# Patient Record
Sex: MALE | Race: WHITE | NOT HISPANIC OR LATINO | Employment: FULL TIME | URBAN - METROPOLITAN AREA
[De-identification: names, ages, dates, MRNs, and addresses within clinical notes are randomized per-mention and may not be internally consistent; named-entity substitution may affect disease eponyms.]

---

## 2018-07-23 ENCOUNTER — HOSPITAL ENCOUNTER (EMERGENCY)
Facility: HOSPITAL | Age: 53
Discharge: HOME | End: 2018-07-23
Attending: EMERGENCY MEDICINE
Payer: COMMERCIAL

## 2018-07-23 VITALS
BODY MASS INDEX: 44.14 KG/M2 | OXYGEN SATURATION: 96 % | HEART RATE: 88 BPM | WEIGHT: 298 LBS | DIASTOLIC BLOOD PRESSURE: 79 MMHG | HEIGHT: 69 IN | SYSTOLIC BLOOD PRESSURE: 165 MMHG | RESPIRATION RATE: 18 BRPM | TEMPERATURE: 98 F

## 2018-07-23 DIAGNOSIS — R52 PAIN: ICD-10-CM

## 2018-07-23 DIAGNOSIS — S20.212A CONTUSION OF LEFT CHEST WALL, INITIAL ENCOUNTER: Primary | ICD-10-CM

## 2018-07-23 PROCEDURE — 99283 EMERGENCY DEPT VISIT LOW MDM: CPT | Mod: 25

## 2018-07-23 RX ORDER — HYDROCODONE BITARTRATE AND ACETAMINOPHEN 5; 325 MG/1; MG/1
1 TABLET ORAL EVERY 4 HOURS PRN
Qty: 18 TABLET | Refills: 0 | Status: SHIPPED | OUTPATIENT
Start: 2018-07-23

## 2018-07-23 NOTE — ED PROVIDER NOTES
Encounter Date: 7/23/2018       History     Chief Complaint   Patient presents with    Collar Bone Pain     I have left collar bone pain while at work- a cylinder fell off my work truck and hit me in my collar bone and i have a big knot on it now. I also have left hand numbness now too     Patient states that a large metal cylinder fell on him at work, this occurred at work.      The history is provided by the patient.   General Injury    The incident occurred just prior to arrival. The incident occurred at work. The injury mechanism was a direct blow. The injury was related to work. The wounds were not self-inflicted. No protective equipment was used. He came to the ER via by private vehicle. Pertinent negatives include no chest pain.     Review of patient's allergies indicates:   Allergen Reactions    Ativan [lorazepam] Anxiety     Past Medical History:   Diagnosis Date    Abnormal liver function tests     Hypertension     Kidney stones     Kidney stones     Morbid obesity      History reviewed. No pertinent surgical history.  Family History   Problem Relation Age of Onset    Diabetes Unknown      Social History   Substance Use Topics    Smoking status: Never Smoker    Smokeless tobacco: Never Used    Alcohol use No     Review of Systems   Cardiovascular: Negative for chest pain.   Skin: Negative for color change, pallor, rash and wound.   All other systems reviewed and are negative.      Physical Exam     Initial Vitals [07/23/18 1430]   BP Pulse Resp Temp SpO2   (!) 165/79 98 20 98.2 °F (36.8 °C) 96 %      MAP       --         Physical Exam    Nursing note and vitals reviewed.  Constitutional: He appears well-developed and well-nourished.   HENT:   Head: Normocephalic and atraumatic.   Eyes: Conjunctivae and EOM are normal.   Neck: Normal range of motion. Neck supple.   Cardiovascular: Normal rate, regular rhythm and normal heart sounds.   Pulmonary/Chest: Breath sounds normal. He has no wheezes. He  has no rhonchi. He has no rales.       Abdominal: Soft. There is no tenderness. There is no rebound and no guarding.   Musculoskeletal: Normal range of motion.   Neurological: He is alert and oriented to person, place, and time.   Skin: Skin is warm and dry. Capillary refill takes less than 2 seconds.   Psychiatric: He has a normal mood and affect. His behavior is normal. Judgment and thought content normal.         ED Course   Procedures  Labs Reviewed - No data to display       Imaging Results          X-Ray Chest PA And Lateral (Final result)  Result time 07/23/18 15:02:09    Final result by Dallas Cain MD (07/23/18 15:02:09)                 Impression:      No acute process seen.      Electronically signed by: Dallas Cain MD  Date:    07/23/2018  Time:    15:02             Narrative:    EXAMINATION:  XR CHEST PA AND LATERAL    CLINICAL HISTORY:  Pain, unspecified    COMPARISON:  None    FINDINGS:  Cardiac silhouette is normal.  The lungs demonstrate no evidence of active disease.  No evidence of pleural effusion or pneumothorax.  Bones appear intact.                              X-Rays:   Independently Interpreted Readings:   Other Readings:  No fracture or dislocation                         Clinical Impression:   The primary encounter diagnosis was Contusion of left chest wall, initial encounter. A diagnosis of Pain was also pertinent to this visit.      Disposition:   Disposition: Discharged  Condition: Stable                        Shara Moss MD  07/23/18 0983